# Patient Record
(demographics unavailable — no encounter records)

---

## 2024-12-03 NOTE — PHYSICAL EXAM
[No Acute Distress] : no acute distress [Well Nourished] : well nourished [Well Developed] : well developed [Well-Appearing] : well-appearing [No Respiratory Distress] : no respiratory distress  [No Accessory Muscle Use] : no accessory muscle use [Clear to Auscultation] : lungs were clear to auscultation bilaterally [Normal Rate] : normal rate  [Regular Rhythm] : with a regular rhythm [Normal S1, S2] : normal S1 and S2 [No Murmur] : no murmur heard [No CVA Tenderness] : no CVA  tenderness [Coordination Grossly Intact] : coordination grossly intact [No Focal Deficits] : no focal deficits [Normal Gait] : normal gait [Normal Affect] : the affect was normal [Normal Insight/Judgement] : insight and judgment were intact [de-identified] : mild midline tenderness at thoracic spine

## 2024-12-03 NOTE — REVIEW OF SYSTEMS
[Muscle Pain] : muscle pain [Back Pain] : back pain [Headache] : headache [Negative] : Heme/Lymph [Joint Pain] : no joint pain [Joint Stiffness] : no joint stiffness [Muscle Weakness] : no muscle weakness [Joint Swelling] : no joint swelling [Dizziness] : no dizziness [Fainting] : no fainting [Confusion] : no confusion [Unsteady Walk] : no ataxia [Memory Loss] : no memory loss

## 2024-12-03 NOTE — PLAN
[FreeTextEntry1] : Rx sent. Refer to orthopedics. Pt advised to sign up for [facility] portal to review labs and communicate any questions or concerns directly. Yearly physical and return as needed for illness, medication refills, and new or existing complaints.

## 2024-12-03 NOTE — HISTORY OF PRESENT ILLNESS
[FreeTextEntry8] : 29 year old M with no significant PMH presents for back pain lasting several years, likely related to his job and arthropathies noted on MRI. He had CPE 2 months ago with his last PCP and thinks blood work is normal but pending transfer of records. Pt denies CP/SOB, fever/chills, n/v/d/c.

## 2024-12-03 NOTE — HEALTH RISK ASSESSMENT
[No] : In the past 12 months have you used drugs other than those required for medical reasons? No [0] : 2) Feeling down, depressed, or hopeless: Not at all (0) [PHQ-2 Negative - No further assessment needed] : PHQ-2 Negative - No further assessment needed [Never] : Never [Audit-CScore] : 0 [[id number]] : 0

## 2025-01-10 NOTE — DISCUSSION/SUMMARY
[Medication Risks Reviewed] : Medication risks reviewed [de-identified] : Assessment: - Summary : 46-year-old male with chronic mid to lower back pain, likely due to occupational strain and possible early degenerative changes. MRI shows small left protrusion at L1-L2 level, correlating with the patient's reported pain location. - Problems : - Chronic mid to lower back pain  - Small left disc protrusion at L1-L2  - Differential Diagnosis : - Occupational strain  - Early degenerative disc disease  - Muscular spasm  - Facet joint syndrome  Plan: - Summary : Management plan focuses on conservative measures including physical therapy, medications, and potential interventional procedures if symptoms persist. - Plan : - Prescribe pool-based physical therapy  - Prescribe meloxicam (anti-inflammatory) as needed for pain relief.  He has been prescribed this previously but did not take that medication  - Prescribe muscle relaxant for nighttime use, methocarbamol prescribed  - Discuss potential for epidural steroid injections if symptoms persist or worsen at the L1 level bilaterally transforaminal  - Recommend lifestyle modifications and ergonomic adjustments at work since he works as a   - Follow-up in 2-4 months or as needed.  For now there is no indication for surgical intervention and we will follow him symptomatically

## 2025-01-10 NOTE — HISTORY OF PRESENT ILLNESS
[de-identified] : - Summary : 46-year-old male presenting with chronic mid to lower back pain since July, worsening over time. Patient works as a , involving labor-intensive tasks. Pain is exacerbated by bending, getting out of bed, and cold weather. Previous treatments include physical therapy and over-the-counter pain medications with minimal relief. - Chief Complaint (CC) : Chronic mid to lower back pain - History of Present Illness (HPI) : Patient reports mid to lower back pain ongoing since July (5-6 months). Pain is constant, rated 9/10 in severity. Exacerbated by bending, getting out of bed, sitting for prolonged periods, and cold weather. Occasionally radiates to neck, causing migraines. No history of specific injury or accident. Previous treatments include 6 weeks of physical therapy and over-the-counter pain medications (Advil, Aleve) with minimal relief. Patient obtained an out-of-pocket MRI due to insurance denial. Changed primary care providers due to dissatisfaction with previous management. - Social History : Occupation . Non-smoker. No alcohol use reported. - Review of Systems : Musculoskeletal Positive for back pain, neck pain. Neurological Occasional migraines. All other systems reviewed and negative. - Medications : No current prescription medications. Occasional use of over-the-counter pain relievers (Advil, Aleve) with minimal effect.

## 2025-01-10 NOTE — PHYSICAL EXAM
[Normal] : Gait: normal [LE] : Sensory: Intact in bilateral lower extremities [Knee] : patellar 2+ and symmetric bilaterally [Ankle] : ankle 2+ and symmetric bilaterally [SLR] : negative straight leg raise [de-identified] : flex to mid tibia with LBPO, ext 20 degrees with TL pain tenderness over TL junction [de-identified] : 4 views cervical spine obtained today demonstrate no significant scoliosis.  Normal cervical lordosis.  No dynamic instability between flexion-extension.  No acute fracture  Exam requested by: YUNIER ZHU  [facility], [address] [place] [address] SITE PERFORMED: [facility] Patient: Hitesh Talley YOB: 1995 Phone: [phone] MRN: [id number] Acc: [id number] Date of Exam: 09-   EXAM:  X-RAY LUMBAR SPINE 2 OR 3 VIEWS  HISTORY:  Low back pain.  TECHNIQUE:  5 radiographic views of the lumbar spine were obtained.  COMPARISON:  None.   Findings::  Osseous structures appear intact without evidence of fracture. No destructive lesions are noted. No significant arthritic changes are noted. Sacroiliac joints appear intact.  Impression: Osseous structures appear intact.  Thank you for the opportunity to participate in the care of this patient.     YVAN KAUR MD  - Electronically Signed: 09- 8:21 AM  Physician to Physician Direct Line is: [phone] ---  		  Exam requested by: YUNIER ZHU MD [address] [place] [address], [address] [place] [address] SITE PERFORMED: [facility] CHAMPAGNE Patient: Hitesh Talley YOB: 1995 Phone: [phone] MRN: [id number] Acc: [id number] Date of Exam: 09-   EXAM: X-RAY THORACIC SPINE 3 VIEWS  HISTORY:  Upper back pain.  TECHNIQUE:  2 radiographic views of the thoracic spine were obtained.  COMPARISON:  None.   FINDINGS:    Osseous structures appear intact without evidence of fracture. No destructive lesions are noted. No significant arthritic changes are noted.   IMPRESSION: Osseous structures appear intact.  Thank you for the opportunity to participate in the care of this patient.     YVAN KAUR MD  - Electronically Signed: 09- 8:22 AM  Physician to Physician Direct Line is: [phone]   -------------  Exam requested by: YUNIER ZHU MD [address] [place] [address], [address] [place] [address] SITE PERFORMED: [facility] Patient: Hitesh Talley YOB: 1995 Phone: [phone] MRN: [id number] Acc: [id number] Date of Exam: 11-   EXAM:  MRI LUMBAR SPINE WITHOUT CONTRAST  HISTORY: Back pain.  TECHNIQUE: Multiplanar, multi-sequential MRI of the lumbar spine was obtained on a 1.5T scanner using a standard protocol.  COMPARISON:  No prior MRI lumbar study is available for comparison. Reference is made to previous plain film examination dated 9/24 2024.  FINDINGS:  The alignment of the lumbar vertebral bodies is normal in the sagittal plane.   Slight disc desiccation noted at L1-2.  No acute compression fracture is observed.  No pathologic bone marrow signal is observed. The vertebral bodies are preserved in height. Small endplate Schmorl's nodes identified at T11-12.  The conus medullaris is normal in position and signal characteristics.  No intradural mass or posterior paraspinal abnormality is observed.  L5 - S1: There is no disc herniation, spinal stenosis, or foraminal stenosis. Mild facet joint arthropathy is noted.  L4 - L5: There is no disc herniation, spinal stenosis, or foraminal stenosis. Mild  facet joint arthropathy is noted.  L3 - L4: There is no disc herniation, spinal stenosis, or foraminal stenosis.  L2 - L3: There is no disc herniation, spinal stenosis, or foraminal stenosis.  L1 - L2: Small left paracentral disc protrusion mildly indents the ventral thecal sac. No spinal canal stenosis or foraminal stenosis identified.   IMPRESSION:  Small left paracentral disc protrusion at L1-2.  Mild facet joint arthropathy at L4-5 and L5-S1.  Thank you for the opportunity to participate in the care of this patient.     Sandeep Valentino Jr, MD  - Electronically Signed: 11- 8:13 AM  Physician to Physician Direct Line is: [phone]